# Patient Record
Sex: FEMALE | Race: WHITE | NOT HISPANIC OR LATINO | Employment: FULL TIME | URBAN - METROPOLITAN AREA
[De-identification: names, ages, dates, MRNs, and addresses within clinical notes are randomized per-mention and may not be internally consistent; named-entity substitution may affect disease eponyms.]

---

## 2017-10-03 ENCOUNTER — GENERIC CONVERSION - ENCOUNTER (OUTPATIENT)
Dept: OTHER | Facility: OTHER | Age: 57
End: 2017-10-03

## 2017-10-03 ENCOUNTER — ALLSCRIPTS OFFICE VISIT (OUTPATIENT)
Dept: OTHER | Facility: OTHER | Age: 57
End: 2017-10-03

## 2017-10-03 DIAGNOSIS — M25.521 PAIN IN RIGHT ELBOW: ICD-10-CM

## 2017-10-03 DIAGNOSIS — R53.83 OTHER FATIGUE: ICD-10-CM

## 2017-10-03 DIAGNOSIS — Z12.31 ENCOUNTER FOR SCREENING MAMMOGRAM FOR MALIGNANT NEOPLASM OF BREAST: ICD-10-CM

## 2017-10-03 DIAGNOSIS — Z86.2 PERSONAL HISTORY OF DISEASES OF THE BLOOD AND BLOOD-FORMING ORGANS AND CERTAIN DISORDERS INVOLVING THE IMMUNE MECHANISM (CODE): ICD-10-CM

## 2017-10-09 LAB
ADEQUACY: (HISTORICAL): NORMAL
CLINICAL HISTORY (HISTORICAL): NORMAL
CLINICIAN PROVIDIED ICD 9 OR 10 (HISTORICAL): NORMAL
COMMENT (HISTORICAL): NORMAL
DIAGNOSIS (HISTORICAL): NORMAL
HPV HIGH RISK (HISTORICAL): NEGATIVE
NOTE: (HISTORICAL): NORMAL
PERFORMED BY (HISTORICAL): NORMAL
TEST INFORMATION (HISTORICAL): NORMAL

## 2017-10-10 ENCOUNTER — GENERIC CONVERSION - ENCOUNTER (OUTPATIENT)
Dept: OTHER | Facility: OTHER | Age: 57
End: 2017-10-10

## 2017-10-20 ENCOUNTER — GENERIC CONVERSION - ENCOUNTER (OUTPATIENT)
Dept: OTHER | Facility: OTHER | Age: 57
End: 2017-10-20

## 2017-11-01 ENCOUNTER — GENERIC CONVERSION - ENCOUNTER (OUTPATIENT)
Dept: OTHER | Facility: OTHER | Age: 57
End: 2017-11-01

## 2017-11-02 ENCOUNTER — APPOINTMENT (OUTPATIENT)
Dept: LAB | Facility: CLINIC | Age: 57
End: 2017-11-02
Payer: COMMERCIAL

## 2017-11-02 ENCOUNTER — TRANSCRIBE ORDERS (OUTPATIENT)
Dept: LAB | Facility: CLINIC | Age: 57
End: 2017-11-02

## 2017-11-02 DIAGNOSIS — R53.83 OTHER FATIGUE: ICD-10-CM

## 2017-11-02 DIAGNOSIS — Z86.2 PERSONAL HISTORY OF DISEASES OF THE BLOOD AND BLOOD-FORMING ORGANS AND CERTAIN DISORDERS INVOLVING THE IMMUNE MECHANISM (CODE): ICD-10-CM

## 2017-11-02 DIAGNOSIS — M25.521 PAIN IN RIGHT ELBOW: ICD-10-CM

## 2017-11-02 LAB
25(OH)D3 SERPL-MCNC: 36.7 NG/ML (ref 30–100)
ALBUMIN SERPL BCP-MCNC: 4.1 G/DL (ref 3.5–5)
ALP SERPL-CCNC: 57 U/L (ref 46–116)
ALT SERPL W P-5'-P-CCNC: 25 U/L (ref 12–78)
ANION GAP SERPL CALCULATED.3IONS-SCNC: 4 MMOL/L (ref 4–13)
AST SERPL W P-5'-P-CCNC: 21 U/L (ref 5–45)
BASOPHILS # BLD AUTO: 0.05 THOUSANDS/ΜL (ref 0–0.1)
BASOPHILS NFR BLD AUTO: 1 % (ref 0–1)
BILIRUB SERPL-MCNC: 0.64 MG/DL (ref 0.2–1)
BUN SERPL-MCNC: 23 MG/DL (ref 5–25)
CALCIUM SERPL-MCNC: 9.2 MG/DL (ref 8.3–10.1)
CHLORIDE SERPL-SCNC: 104 MMOL/L (ref 100–108)
CHOLEST SERPL-MCNC: 158 MG/DL (ref 50–200)
CK SERPL-CCNC: 86 U/L (ref 26–192)
CO2 SERPL-SCNC: 29 MMOL/L (ref 21–32)
CREAT SERPL-MCNC: 0.98 MG/DL (ref 0.6–1.3)
CRP SERPL QL: <3 MG/L
EOSINOPHIL # BLD AUTO: 0.17 THOUSAND/ΜL (ref 0–0.61)
EOSINOPHIL NFR BLD AUTO: 2 % (ref 0–6)
ERYTHROCYTE [DISTWIDTH] IN BLOOD BY AUTOMATED COUNT: 12.6 % (ref 11.6–15.1)
ERYTHROCYTE [SEDIMENTATION RATE] IN BLOOD: 5 MM/HOUR (ref 0–20)
GFR SERPL CREATININE-BSD FRML MDRD: 63 ML/MIN/1.73SQ M
GLUCOSE P FAST SERPL-MCNC: 91 MG/DL (ref 65–99)
HCT VFR BLD AUTO: 41.4 % (ref 34.8–46.1)
HDLC SERPL-MCNC: 79 MG/DL (ref 40–60)
HGB BLD-MCNC: 13.7 G/DL (ref 11.5–15.4)
LDLC SERPL CALC-MCNC: 73 MG/DL (ref 0–100)
LYMPHOCYTES # BLD AUTO: 1.56 THOUSANDS/ΜL (ref 0.6–4.47)
LYMPHOCYTES NFR BLD AUTO: 21 % (ref 14–44)
MCH RBC QN AUTO: 30 PG (ref 26.8–34.3)
MCHC RBC AUTO-ENTMCNC: 33.1 G/DL (ref 31.4–37.4)
MCV RBC AUTO: 91 FL (ref 82–98)
MONOCYTES # BLD AUTO: 0.42 THOUSAND/ΜL (ref 0.17–1.22)
MONOCYTES NFR BLD AUTO: 6 % (ref 4–12)
NEUTROPHILS # BLD AUTO: 5.26 THOUSANDS/ΜL (ref 1.85–7.62)
NEUTS SEG NFR BLD AUTO: 70 % (ref 43–75)
NRBC BLD AUTO-RTO: 0 /100 WBCS
PLATELET # BLD AUTO: 220 THOUSANDS/UL (ref 149–390)
PMV BLD AUTO: 11.9 FL (ref 8.9–12.7)
POTASSIUM SERPL-SCNC: 4.5 MMOL/L (ref 3.5–5.3)
PROT SERPL-MCNC: 7.5 G/DL (ref 6.4–8.2)
RBC # BLD AUTO: 4.57 MILLION/UL (ref 3.81–5.12)
SODIUM SERPL-SCNC: 137 MMOL/L (ref 136–145)
TRIGL SERPL-MCNC: 31 MG/DL
TSH SERPL DL<=0.05 MIU/L-ACNC: 2.44 UIU/ML (ref 0.36–3.74)
WBC # BLD AUTO: 7.48 THOUSAND/UL (ref 4.31–10.16)

## 2017-11-02 PROCEDURE — 84443 ASSAY THYROID STIM HORMONE: CPT

## 2017-11-02 PROCEDURE — 85025 COMPLETE CBC W/AUTO DIFF WBC: CPT

## 2017-11-02 PROCEDURE — 80061 LIPID PANEL: CPT

## 2017-11-02 PROCEDURE — 36415 COLL VENOUS BLD VENIPUNCTURE: CPT

## 2017-11-02 PROCEDURE — 80053 COMPREHEN METABOLIC PANEL: CPT

## 2017-11-02 PROCEDURE — 86140 C-REACTIVE PROTEIN: CPT

## 2017-11-02 PROCEDURE — 85652 RBC SED RATE AUTOMATED: CPT

## 2017-11-02 PROCEDURE — 82550 ASSAY OF CK (CPK): CPT

## 2017-11-02 PROCEDURE — 82306 VITAMIN D 25 HYDROXY: CPT

## 2017-11-02 PROCEDURE — 86430 RHEUMATOID FACTOR TEST QUAL: CPT

## 2017-11-02 PROCEDURE — 86038 ANTINUCLEAR ANTIBODIES: CPT

## 2017-11-02 PROCEDURE — 86618 LYME DISEASE ANTIBODY: CPT

## 2017-11-03 LAB
B BURGDOR IGG SER IA-ACNC: 0.24
B BURGDOR IGM SER IA-ACNC: 0.25
RHEUMATOID FACT SER QL LA: NEGATIVE
RYE IGE QN: NEGATIVE

## 2017-12-07 ENCOUNTER — GENERIC CONVERSION - ENCOUNTER (OUTPATIENT)
Dept: FAMILY MEDICINE CLINIC | Facility: CLINIC | Age: 57
End: 2017-12-07

## 2018-01-12 NOTE — RESULT NOTES
Verified Results  (LC) PapIG, HPV, rfx 16/18 [id number] 12:00AM Umu Garcia   No  of containers  [facility] 01 ThinPrep Vial     Test Name Result Flag Reference   DIAGNOSIS: Comment     NEGATIVE FOR INTRAEPITHELIAL LESION AND MALIGNANCY  Specimen adequacy: Comment     Satisfactory for evaluation  Endocervical and/or squamous metaplastic  cells (endocervical component) are present  Clinician provided ICD10: Comment     [id number]   Clinical history: Comment     Invalid Gender   Performed by: Tea Deal, Cytotechnologist (ASCP)   Comm   Note: Comment     The Pap smear is a screening test designed to aid in the detection of  premalignant and malignant conditions of the uterine cervix  It is not a  diagnostic procedure and should not be used as the sole means of detecting  cervical cancer  Both false-positive and false-negative reports do occur  Test Methodology: Comment     This liquid based ThinPrep(R) pap test was screened with the  use of an image guided system  HPV, high-risk Negative  Negative   This high-risk HPV test detects thirteen high-risk types  (16/18/31/33/35/39/45/51/52/56/58/59/68) without differentiation

## 2018-01-22 VITALS
HEIGHT: 63 IN | DIASTOLIC BLOOD PRESSURE: 74 MMHG | BODY MASS INDEX: 22.86 KG/M2 | OXYGEN SATURATION: 99 % | WEIGHT: 129 LBS | SYSTOLIC BLOOD PRESSURE: 106 MMHG | HEART RATE: 62 BPM | TEMPERATURE: 97.1 F

## 2018-01-22 VITALS
SYSTOLIC BLOOD PRESSURE: 122 MMHG | BODY MASS INDEX: 22.09 KG/M2 | HEIGHT: 64 IN | WEIGHT: 129.38 LBS | DIASTOLIC BLOOD PRESSURE: 76 MMHG

## 2018-01-22 VITALS
WEIGHT: 128.38 LBS | OXYGEN SATURATION: 99 % | HEIGHT: 63 IN | TEMPERATURE: 98.1 F | DIASTOLIC BLOOD PRESSURE: 82 MMHG | HEART RATE: 58 BPM | BODY MASS INDEX: 22.75 KG/M2 | SYSTOLIC BLOOD PRESSURE: 144 MMHG

## 2023-07-17 ENCOUNTER — TELEPHONE (OUTPATIENT)
Dept: NEUROLOGY | Facility: CLINIC | Age: 63
End: 2023-07-17

## 2023-11-25 ENCOUNTER — APPOINTMENT (OUTPATIENT)
Dept: RADIOLOGY | Age: 63
End: 2023-11-25
Payer: MEDICARE

## 2023-11-25 ENCOUNTER — OFFICE VISIT (OUTPATIENT)
Dept: URGENT CARE | Age: 63
End: 2023-11-25
Payer: MEDICARE

## 2023-11-25 VITALS
OXYGEN SATURATION: 100 % | DIASTOLIC BLOOD PRESSURE: 81 MMHG | HEIGHT: 64 IN | RESPIRATION RATE: 18 BRPM | SYSTOLIC BLOOD PRESSURE: 165 MMHG | BODY MASS INDEX: 21.85 KG/M2 | TEMPERATURE: 98 F | HEART RATE: 77 BPM | WEIGHT: 128 LBS

## 2023-11-25 DIAGNOSIS — S62.633A CLOSED DISPLACED FRACTURE OF DISTAL PHALANX OF LEFT MIDDLE FINGER, INITIAL ENCOUNTER: Primary | ICD-10-CM

## 2023-11-25 DIAGNOSIS — S69.92XA INJURY OF FINGER OF LEFT HAND, INITIAL ENCOUNTER: ICD-10-CM

## 2023-11-25 PROCEDURE — 99212 OFFICE O/P EST SF 10 MIN: CPT | Performed by: PHYSICIAN ASSISTANT

## 2023-11-25 PROCEDURE — G0463 HOSPITAL OUTPT CLINIC VISIT: HCPCS | Performed by: PHYSICIAN ASSISTANT

## 2023-11-25 PROCEDURE — 73140 X-RAY EXAM OF FINGER(S): CPT

## 2023-11-25 RX ORDER — CLOBETASOL PROPIONATE 0.5 MG/G
OINTMENT TOPICAL
COMMUNITY
Start: 2023-08-09

## 2023-11-25 RX ORDER — LAMOTRIGINE 100 MG/1
1 TABLET ORAL 2 TIMES DAILY
COMMUNITY
Start: 2023-10-23

## 2023-11-25 RX ORDER — ESTRADIOL 0.1 MG/G
CREAM VAGINAL
COMMUNITY

## 2023-11-25 NOTE — PROGRESS NOTES
[facility]    NAME: Génesis Dasilva is a 70 y.o. female  : 1960    MRN: [id number]  DATE: 2023  TIME: 10:45 AM    Assessment and Plan   Closed displaced fracture of distal phalanx of left middle finger, initial encounter [S62.633A]  1. Closed displaced fracture of distal phalanx of left middle finger, initial encounter  XR finger left third digit-middle    Ambulatory referral to Orthopedic Surgery        X-ray left middle finger with acute fracture. Await radiologist final test results. Patient has her own splint. Provider did help secure with [place]. Patient Instructions     Patient Instructions   Splint for protection. Referral to ortho. You may call the orthopedic scheduling office at [phone] to inquire about scheduling an appointment. Chief Complaint     Chief Complaint   Patient presents with    Finger Injury     Left 3rd finger injury x 2 days       History of Present Illness   Génesis Dasilva presents to the clinic c/o  Pt comes in with complaint of: Left middle finger pain. Started: Couple days ago was playing football with her grandson and took the ball on the end of her finger causing injury. Associated symptoms: Pain, swelling, limited range of motion. Modifying factors: Using splint for protection. Hand dominance: Right. Will be leaving this next week to go out of town for 2 weeks and then will be home for 2 weeks before she leaves again for a few months. Review of Systems   Review of Systems   Constitutional: Negative. Musculoskeletal:  Positive for arthralgias and joint swelling. Skin:  Negative for color change.        Current Medications     Long-Term Medications   Medication Sig Dispense Refill    clobetasol (TEMOVATE) 0.05 % ointment Apply to affected area twice dailyx2 weeks then [id number] then 1-2x weekly      estradiol (ESTRACE VAGINAL) 0.1 mg/g vaginal cream Insert into the vagina      lamoTRIgine (LaMICtal) 100 mg tablet Take 1 tablet by mouth 2 (two) times a day      Omega-3 Fatty Acids (Fish Oil) 645 MG CAPS Take by mouth         Current Allergies     Allergies as of 2023    (No Known Allergies)          The following portions of the patient's history were reviewed and updated as appropriate: allergies, current medications, past family history, past medical history, past social history, past surgical history and problem list.  Past Medical History:   Diagnosis Date    Seizure ([address])      Past Surgical History:   Procedure Laterality Date     SECTION      DENTAL SURGERY      FRACTURE SURGERY Bilateral     TUBAL LIGATION       History reviewed. No pertinent family history. Objective   /81   Pulse 77   Temp 98 °F (36.7 °C)   Resp 18   Ht 5' 4" (1.626 m)   Wt 58.1 kg (128 lb)   SpO2 100%   BMI 21.97 kg/m²   No LMP recorded. Physical Exam     Physical Exam  Vitals and nursing note reviewed. Constitutional:       General: She is not in acute distress. Appearance: Normal appearance. She is well-developed. She is not ill-appearing, toxic-appearing or diaphoretic. Cardiovascular:      Rate and Rhythm: Normal rate. Pulmonary:      Effort: Pulmonary effort is normal.   Musculoskeletal:         General: Swelling, tenderness and signs of injury present. Comments: DIP left middle finger with swelling and TTP. Skin:     General: Skin is warm and dry. Findings: No bruising or rash. Neurological:      Mental Status: She is alert and oriented to person, place, and time.    Psychiatric:         Mood and Affect: Mood normal.         Behavior: Behavior normal.

## 2023-11-25 NOTE — PATIENT INSTRUCTIONS
Splint for protection. Referral to ortho. You may call the orthopedic scheduling office at [phone] to inquire about scheduling an appointment.

## 2023-11-27 ENCOUNTER — OFFICE VISIT (OUTPATIENT)
Dept: OBGYN CLINIC | Facility: MEDICAL CENTER | Age: 63
End: 2023-11-27
Payer: MEDICARE

## 2023-11-27 ENCOUNTER — OFFICE VISIT (OUTPATIENT)
Dept: PHYSICAL THERAPY | Facility: MEDICAL CENTER | Age: 63
End: 2023-11-27
Payer: MEDICARE

## 2023-11-27 VITALS
HEIGHT: 64 IN | HEART RATE: 60 BPM | WEIGHT: 130.2 LBS | DIASTOLIC BLOOD PRESSURE: 81 MMHG | BODY MASS INDEX: 22.23 KG/M2 | SYSTOLIC BLOOD PRESSURE: 139 MMHG

## 2023-11-27 DIAGNOSIS — M20.012 ACQUIRED MALLET DEFORMITY OF LEFT MIDDLE FINGER: ICD-10-CM

## 2023-11-27 DIAGNOSIS — S62.633A CLOSED DISPLACED FRACTURE OF DISTAL PHALANX OF LEFT MIDDLE FINGER, INITIAL ENCOUNTER: ICD-10-CM

## 2023-11-27 DIAGNOSIS — S62.633A CLOSED DISPLACED FRACTURE OF DISTAL PHALANX OF LEFT MIDDLE FINGER, INITIAL ENCOUNTER: Primary | ICD-10-CM

## 2023-11-27 DIAGNOSIS — M20.012 ACQUIRED MALLET DEFORMITY OF LEFT MIDDLE FINGER: Primary | ICD-10-CM

## 2023-11-27 PROCEDURE — 97760 ORTHOTIC MGMT&TRAING 1ST ENC: CPT | Performed by: PHYSICAL THERAPIST

## 2023-11-27 PROCEDURE — 99203 OFFICE O/P NEW LOW 30 MIN: CPT | Performed by: ORTHOPAEDIC SURGERY

## 2023-11-27 NOTE — PROGRESS NOTES
Splint    Diagnosis:   1. Closed displaced fracture of distal phalanx of left middle finger, initial encounter  Ambulatory Referral to PT/OT Hand Therapy      2. 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 Yes  Patient or Caregiver able to don/doff orthotic independently? Yes    Written orders provided to patient?  Yes  Orders Obtained: Written  Orders Obtained from: Evangelina Montejo MD

## 2023-12-27 ENCOUNTER — OFFICE VISIT (OUTPATIENT)
Dept: OBGYN CLINIC | Facility: MEDICAL CENTER | Age: 63
End: 2023-12-27
Payer: MEDICARE

## 2023-12-27 ENCOUNTER — OFFICE VISIT (OUTPATIENT)
Dept: PHYSICAL THERAPY | Facility: MEDICAL CENTER | Age: 63
End: 2023-12-27

## 2023-12-27 VITALS
DIASTOLIC BLOOD PRESSURE: 79 MMHG | HEIGHT: 64 IN | BODY MASS INDEX: 21.51 KG/M2 | SYSTOLIC BLOOD PRESSURE: 122 MMHG | WEIGHT: 126 LBS | HEART RATE: 62 BPM

## 2023-12-27 DIAGNOSIS — M20.012 ACQUIRED MALLET DEFORMITY OF LEFT MIDDLE FINGER: ICD-10-CM

## 2023-12-27 DIAGNOSIS — M20.012 ACQUIRED MALLET DEFORMITY OF LEFT MIDDLE FINGER: Primary | ICD-10-CM

## 2023-12-27 PROCEDURE — 99213 OFFICE O/P EST LOW 20 MIN: CPT | Performed by: ORTHOPAEDIC SURGERY

## 2023-12-27 RX ORDER — AMOXICILLIN 500 MG/1
CAPSULE ORAL
COMMUNITY
Start: 2023-10-09

## 2023-12-27 NOTE — PROGRESS NOTES
Mallet splint adjusted for fit using dorsal splint design. Patient has KT to hold DIP into hyperextension. Encouraged her to return for adjustments as needed.

## 2023-12-28 NOTE — PROGRESS NOTES
HAND & UPPER EXTREMITY OFFICE VISIT   Referred By:  Shadia Mace Md  [facility]  [address] [place] [address]  [address] [place] [id number]      Chief Complaint:     Left long finger pain    Previous History:   63-year-old right-hand-dominant female with left long finger pain after a bony mallet injury on 2023.  She is initially placed in a custom extension splint by hand therapy and instructed on its use.  The plan was for 6 weeks of full-time finger extension followed by likely 6 weeks of nighttime use.    Interval History:  Returns for follow-up 6 weeks after initial visit.  She states that she is been having difficulty keeping the splint on.  She states the initial Velcro straps were not holding in place.  She has been taping it but states it is still coming off sometimes in her fingers bending.  Shortly after obtaining her extension splint she did go on vacation and was unable to return for any adjustments.  She has not followed up with a therapist since the splint was made.  She is worried that she will need continued splinting.    Past Medical History:  Past Medical History:   Diagnosis Date    Seizure (HCC)      Past Surgical History:   Procedure Laterality Date     SECTION      DENTAL SURGERY      FRACTURE SURGERY Bilateral     TUBAL LIGATION       History reviewed. No pertinent family history.  Social History     Socioeconomic History    Marital status: Single     Spouse name: Not on file    Number of children: Not on file    Years of education: Not on file    Highest education level: Not on file   Occupational History    Not on file   Tobacco Use    Smoking status: Never     Passive exposure: Never    Smokeless tobacco: Never   Vaping Use    Vaping status: Never Used   Substance and Sexual Activity    Alcohol use: Yes    Drug use: Never    Sexual activity: Not on file   Other Topics Concern    Not on file   Social History Narrative    Not on file     Social  "Determinants of Health     Financial Resource Strain: Not on file   Food Insecurity: Not on file   Transportation Needs: Not on file   Physical Activity: Not on file   Stress: Not on file   Social Connections: Not on file   Intimate Partner Violence: Not on file   Housing Stability: Not on file     Scheduled Meds:  Continuous Infusions:No current facility-administered medications for this visit.    PRN Meds:.  Allergies   Allergen Reactions    Nitrofurantoin Rash     Other reaction(s): RASH, PULMONARY REACTION    Diazepam Other (See Comments)     Larthagic       Physical Examination:    /79   Pulse 62   Ht 5' 4\" (1.626 m)   Wt 57.2 kg (126 lb)   BMI 21.63 kg/m²     Gen: A&Ox3, NAD  Cardiac: regular rate  Chest: non labored breathing  Abdomen: Non-distended    Left Upper Extremity:  Skin CDI  No obvious deformity of the shoulder, arm, elbow, forearm, wrist, hand  Sensation intact to light touch in the axillary median, ulnar, and radial nerve distributions  5/5 motor   2+RP  Swelling DIP joint of left long finger with TTP   Not able to extend finger at DIP joint with ~40 deg lag, able to extend at PIP and MCP joints.   Sensation intact all digits    Studies:  No new imaging.    Assessment and Plan:  1. 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 female presents in follow up for the above diagnosis.  Unfortunate she is been having difficulties with splint wear.  Her finger still has an extensive lag and we will have to restart the splinting process.  A new referral was given to hand therapy today for a fabrication of a custom dorsally based hyperextension DIP splint.  Hopefully this will fit better and allow her to maintain finger extension for the entire course of treatment.  I will see her in 4 weeks for repeat evaluation.    she expressed understanding of the plan and agreed. We encouraged them to contact our office with any questions or " concerns.       Valente Gonzalez MD  Hand and Upper Extremity Surgery      *This note was dictated using Dragon voice recognition software. Please excuse any word substitutions or errors.*

## 2024-01-15 ENCOUNTER — TELEPHONE (OUTPATIENT)
Dept: NEUROLOGY | Facility: CLINIC | Age: 64
End: 2024-01-15

## 2024-01-15 NOTE — TELEPHONE ENCOUNTER
Pt left a vm but I was not able to pull up her chart.   Cb [phone]     Call returned to pt. Acknowledge vm. Pt states that she got a missed call from our office. Advised pt that I don't see any documentation about this call. Pt did confirm her NP appt w/ Dr Jo on 4/3/24 at 2 pm.

## 2024-02-07 ENCOUNTER — OFFICE VISIT (OUTPATIENT)
Dept: OBGYN CLINIC | Facility: MEDICAL CENTER | Age: 64
End: 2024-02-07
Payer: MEDICARE

## 2024-02-07 VITALS
SYSTOLIC BLOOD PRESSURE: 112 MMHG | WEIGHT: 126 LBS | HEIGHT: 64 IN | HEART RATE: 64 BPM | BODY MASS INDEX: 21.51 KG/M2 | DIASTOLIC BLOOD PRESSURE: 72 MMHG

## 2024-02-07 DIAGNOSIS — M20.012 ACQUIRED MALLET DEFORMITY OF LEFT MIDDLE FINGER: Primary | ICD-10-CM

## 2024-02-07 PROCEDURE — 99213 OFFICE O/P EST LOW 20 MIN: CPT | Performed by: ORTHOPAEDIC SURGERY

## 2024-02-07 NOTE — PROGRESS NOTES
HAND & UPPER EXTREMITY OFFICE VISIT   Referred By:  No referring provider defined for this encounter.      Chief Complaint:     Left long finger pain     Previous History:   Patient was last seen on 2023 and she was advised to get a new splint fabricated and start the immobilization process over again    Interval History:  Since last visit she got a new splint and this new splint felt like a better fit. She has been alternating between the two splints when she washes her hands. She states she has been splint compliant and the therapy even used tape on her finger under her splint which seems to keep her finger in better extension  Some soreness at her finger tip. Presetns with splint on today    Past Medical History:  Past Medical History:   Diagnosis Date    Seizure (HCC)      Past Surgical History:   Procedure Laterality Date     SECTION      DENTAL SURGERY      FRACTURE SURGERY Bilateral     TUBAL LIGATION       No family history on file.  Social History     Socioeconomic History    Marital status: Single     Spouse name: Not on file    Number of children: Not on file    Years of education: Not on file    Highest education level: Not on file   Occupational History    Not on file   Tobacco Use    Smoking status: Never     Passive exposure: Never    Smokeless tobacco: Never   Vaping Use    Vaping status: Never Used   Substance and Sexual Activity    Alcohol use: Yes    Drug use: Never    Sexual activity: Not on file   Other Topics Concern    Not on file   Social History Narrative    Not on file     Social Determinants of Health     Financial Resource Strain: Not on file   Food Insecurity: Not on file   Transportation Needs: Not on file   Physical Activity: Not on file   Stress: Not on file   Social Connections: Not on file   Intimate Partner Violence: Not on file   Housing Stability: Not on file     Scheduled Meds:  Continuous Infusions:No current facility-administered medications for this  "visit.    PRN Meds:.  Allergies   Allergen Reactions    Nitrofurantoin Rash     Other reaction(s): RASH, PULMONARY REACTION    Diazepam Other (See Comments)     Larthagic       Physical Examination:    /72   Pulse 64   Ht 5' 4\" (1.626 m)   Wt 57.2 kg (126 lb)   BMI 21.63 kg/m²     Gen: A&Ox3, NAD  Cardiac: regular rate  Chest: non labored breathing  Abdomen: Non-distended        Right Upper Extremity:  Skin CDI  No obvious deformity of the shoulder, arm, elbow, forearm, wrist, hand  Sensation intact to light touch in the axillary median, ulnar, and radial nerve distributions  motor intact  2+RP      Left Upper Extremity:  Skin CDI  No obvious deformity of the shoulder, arm, elbow, forearm, wrist, hand  Sensation intact to light touch in the axillary median, ulnar, and radial nerve distributions  motor intact  2+RP  No lag noted at DIP joint of Middle finger with splint and tape removed  Composite fist not tested    Studies:  No new images obtained      Assessment and Plan:  1. 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 female presents in follow up for the above diagnosis.     No lag noted at her DIP when the tape and splint were removed. At this time she may start gentle flexion of her finger and remove the splint during the day. She will be stiff into flexion so she was advised to start with gentle ROM first. She was encouraged to work on forming a composite fist  She should wear the splint at night to sleep  NSAID's as needed for pain    F/U in 6 weeks for ROM check    she expressed understanding of the plan and agreed. We encouraged them to contact our office with any questions or concerns.       Valente Gonzalez MD  Hand and Upper Extremity Surgery      *This note was dictated using Dragon voice recognition software. Please excuse any word substitutions or errors.*    "

## 2024-02-16 ENCOUNTER — TELEPHONE (OUTPATIENT)
Dept: OBGYN CLINIC | Facility: MEDICAL CENTER | Age: 64
End: 2024-02-16

## 2024-02-16 NOTE — TELEPHONE ENCOUNTER
The pt was called due to an appt that was scheduled during a time that conflicts with the provider. The pt confirmed that changing the appt to an earlier time was okay.

## 2024-03-14 ENCOUNTER — OFFICE VISIT (OUTPATIENT)
Dept: OBGYN CLINIC | Facility: MEDICAL CENTER | Age: 64
End: 2024-03-14
Payer: MEDICARE

## 2024-03-14 VITALS
WEIGHT: 126 LBS | SYSTOLIC BLOOD PRESSURE: 126 MMHG | HEIGHT: 64 IN | DIASTOLIC BLOOD PRESSURE: 78 MMHG | BODY MASS INDEX: 21.51 KG/M2 | HEART RATE: 52 BPM

## 2024-03-14 DIAGNOSIS — M20.012 ACQUIRED MALLET DEFORMITY OF LEFT MIDDLE FINGER: Primary | ICD-10-CM

## 2024-03-14 PROCEDURE — 99213 OFFICE O/P EST LOW 20 MIN: CPT | Performed by: ORTHOPAEDIC SURGERY

## 2024-03-14 RX ORDER — OMEPRAZOLE 20 MG/1
CAPSULE, DELAYED RELEASE ORAL
COMMUNITY
Start: 2024-03-14

## 2024-03-14 NOTE — PROGRESS NOTES
"    HAND & UPPER EXTREMITY OFFICE VISIT   Referred By:  No referring provider defined for this encounter.      Chief Complaint:     Left long finger pain    Previous History:   Patient was last seen on 24 and was instructed to begin gentle flexion of her finger and to remove the splint during the day but must wear it to sleep at night.     Interval History:  Since her last visit patient reports that she has discontinued the use of her splint without issue. She denies pain about the index finger on a daily basis. She continues to perform home stretching program with benefit. She does note a small \"bump\" on the dorsal aspect of her index finger that is not painful. No numbness or tingling. No fevers or chills.     Past Medical History:  Past Medical History:   Diagnosis Date    Seizure (HCC)      Past Surgical History:   Procedure Laterality Date     SECTION      DENTAL SURGERY      FRACTURE SURGERY Bilateral     TUBAL LIGATION       History reviewed. No pertinent family history.  Social History     Socioeconomic History    Marital status: Single     Spouse name: Not on file    Number of children: Not on file    Years of education: Not on file    Highest education level: Not on file   Occupational History    Not on file   Tobacco Use    Smoking status: Never     Passive exposure: Never    Smokeless tobacco: Never   Vaping Use    Vaping status: Never Used   Substance and Sexual Activity    Alcohol use: Yes    Drug use: Never    Sexual activity: Not on file   Other Topics Concern    Not on file   Social History Narrative    Not on file     Social Determinants of Health     Financial Resource Strain: Not on file   Food Insecurity: Not on file   Transportation Needs: Not on file   Physical Activity: Not on file   Stress: Not on file   Social Connections: Not on file   Intimate Partner Violence: Not on file   Housing Stability: Not on file     Scheduled Meds:  Continuous Infusions:No current " "facility-administered medications for this visit.    PRN Meds:.  Allergies   Allergen Reactions    Nitrofurantoin Rash     Other reaction(s): RASH, PULMONARY REACTION    Diazepam Other (See Comments)     Larthagic       Physical Examination:    /78   Pulse (!) 52   Ht 5' 4\" (1.626 m)   Wt 57.2 kg (126 lb)   BMI 21.63 kg/m²     Gen: A&Ox3, NAD  Cardiac: regular rate  Chest: non labored breathing  Abdomen: Non-distended    Cervcial Spine: Negative Spurling's    Left Upper Extremity:  Skin intact  Sensation intact to light touch in the axillary median, ulnar, and radial nerve distributions  A 10 degree extensor lag noted at DIP joint of Middle finger with full flexion of the DIP joint  There is a slight dorsal prominence over the proximal distal phalanx consistent with scar tissue  Composite fist  2+RP    Studies:  No new imaging      Assessment and Plan:  1. 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 female presents in follow up for the above diagnosis.  Very slight residual extensor lag noted with full flexion. No pain on examination. She may continue with gentle ROM exercises as tolerated.  We discussed that the \"bump\" on the dorsal aspect of her finger is related to scar tissue from tendon healing and the slight extensor lag making the base of the distal phalanx more prominent.  This will likely not decrease in size in a significant manner however she can participate in scar massage to see if she can smooth it out slightly.  Overall the bump does not bother some but is noticeable to her.  She will try scar massage.  She will follow up on an as needed basis    she expressed understanding of the plan and agreed. We encouraged them to contact our office with any questions or concerns.       Valente Gonzalez MD  Hand and Upper Extremity Surgery      *This note was dictated using Dragon voice recognition software. Please excuse any word substitutions or errors.*    Scribe Attestation  "     I,:  Madi Garcia am acting as a scribe while in the presence of the attending physician.:       I,:  Valente Gonzalez MD personally performed the services described in this documentation    as scribed in my presence.:

## 2024-04-03 ENCOUNTER — OFFICE VISIT (OUTPATIENT)
Dept: NEUROLOGY | Facility: CLINIC | Age: 64
End: 2024-04-03
Payer: MEDICARE

## 2024-04-03 VITALS
DIASTOLIC BLOOD PRESSURE: 65 MMHG | WEIGHT: 128.2 LBS | BODY MASS INDEX: 21.89 KG/M2 | TEMPERATURE: 97.9 F | SYSTOLIC BLOOD PRESSURE: 145 MMHG | OXYGEN SATURATION: 99 % | HEIGHT: 64 IN | HEART RATE: 57 BPM

## 2024-04-03 DIAGNOSIS — R56.9 SEIZURE (HCC): Primary | ICD-10-CM

## 2024-04-03 PROCEDURE — 99205 OFFICE O/P NEW HI 60 MIN: CPT | Performed by: PSYCHIATRY & NEUROLOGY

## 2024-04-03 RX ORDER — FAMOTIDINE 10 MG
10 TABLET ORAL AS NEEDED
COMMUNITY

## 2024-04-03 RX ORDER — POLYETHYLENE GLYCOL 3350 17 G/17G
17 POWDER, FOR SOLUTION ORAL DAILY
COMMUNITY

## 2024-04-03 RX ORDER — LACOSAMIDE 100 MG/1
100 TABLET ORAL EVERY 12 HOURS SCHEDULED
Qty: 60 TABLET | Refills: 5 | Status: SHIPPED | OUTPATIENT
Start: 2024-04-03

## 2024-04-03 NOTE — PATIENT INSTRUCTIONS
Start lacosamide 100 mg pills. Take half pill twice daily for 3 days and then take 1 pill twice daily.   Continue lamotrigine  mg daily until take lacosamide 100 mg twice daily for one week and then stop lamotrigine.   Have EEG done.   Let us know if there are events concerning for seizure.   Return in about 4 months.

## 2024-04-03 NOTE — PROGRESS NOTES
[facility]  Initial Consultation    Impression/Plan    Ms. Vidal is a 63 y.o. female presenting for evaluation regarding possible epilepsy. She had brief, stereotyped, episodes of feeling spaced out, some with possible cognitive symptoms or delayed responses, that resolved with clonazepam in 2021 and may have been seizure, but anxiety is also a possible explanation. She then had 2 apparent generalized tonic clonic seizures without significant prodrome in 7/2023. The second event was associated with bradycardia. Subsequent ambulatory cardiac monitoring was unremarkable. The clinical description is relatively convincing for seizures, but convulsive syncope remains a less likely possibility. Brain MRIs and EEGs, including 72 hour AEEG, have been unrevealing.     In summary, there were mild possible focal aware seizures and then 2 probable bilateral tonic clonic seizures in 24 hours. She does not meet criteria for a definite diagnosis of epilepsy, but I think an underlying seizure tendency is likely and recommend continuing anti-seizure medication for now. Obtaining sleep deprived EEG to try and confirm diagnosis. She is experiencing insomnia since starting lamotrigine. Will transition to lacosamide.     Patient Instructions   Start lacosamide 100 mg pills. Take half pill twice daily for 3 days and then take 1 pill twice daily.   Continue lamotrigine  mg daily until take lacosamide 100 mg twice daily for one week and then stop lamotrigine.   Have EEG done.   Let us know if there are events concerning for seizure.   Return in about 4 months.      Diagnoses and all orders for this visit:    Seizure (HCC)  -     EEG Sleep deprived; Future  -     lacosamide (VIMPAT) 100 mg tablet; Take 1 tablet (100 mg total) by mouth every 12 (twelve) hours    Other orders  -     Probiotic Product (PROBIOTIC PO); Take by mouth  -     famotidine (PEPCID) 10 mg tablet; Take 10 mg by mouth if  "needed for heartburn  -     polyethylene glycol (GLYCOLAX) 17 GM/SCOOP powder; Take 17 g by mouth daily        Subjective    Ashley Vidal is a 63 y.o. female presenting to the [facility] for evaluation of seizures. She was previously seen by Dr. Daljit Bianchi with [facility] neurology in 2/2024. History is from the patient, her significant other (by phone) and review of the records (Dr. Bianchi's notes, records from [place]).     Fall of 2020. 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 First event was when driving home from work. Suddenly spacey and maybe lightheaded (but not faint), more spaced out. Lasted a few seconds, less than 30 seconds. Wouldn't cluster. Could talk through it, not tired afterward. No associated anxiety or other sensations. No chest sensation, no palpitations, no vision change. Events continued to occur. Had EEGs, including 72 hour AEEG that was normal. Brain MRI normal. She was started on clonazepam with the consideration that events could be due to seizure or anxiety. Events resolved with clonazepam in 2021. She was taking 0.25 mg bid.     She tells me there were no features of the events that would allow them to be identified by others, such as difficulty with her speech or behavioral arrest - her significant other agrees with this and states that she would have to tell him that an event occurred. However, some of the notes by Dr. Bianchi suggest there were features that could be identified by others. When I asked her about this she tells me that there was a misunderstanding and that her friends were talking to her once and expecting her to reply, but she did not respond because she was trying to focus on her event so that she could figure out how to explain the symptoms to them. Dr. Bianchi's history from 7/2021:   \"She states she is having \"brain fades\", she first experienced this back in " "October of 2020. She describes it as being spaced out, cloudy and then it clears.  She thinks this can last from seconds to about less than a minute. She says she was driving home in October 2020 when she experienced the first episode. She did not lose control of her car, she knew she was driving and her brain felt a little fuzzy. Her friends accompany her and describe this as her having a hard time saying what she wanted to say. Recently she had four episodes in one day , this was about two weeks ago. She was confused driving to her friend's house but made it there. She says while she was out shopping with her friends she suddenly had a vision, described as a dream that lasted about less than a minute while walking. She then forgot what this dream or vision was about suddenly as if she had woken from a dream. After this she appeared foggy to her friends, spacy and having difficulty getting words out. They were then driving going to a different place when she was talking and suddenly stopped , she was stuttering, not making since and she read a 50 MPH sign and said plow and does not remember the episode herself. She says last year possibly in August she fell while using a machine at home and landed on her head but did not lose consciousness. \"    In [place] 7/21/2023. She was traveling and hiking. Was with significant other and his family. At [facility]. Was not strenuous. Sat and then event occurred when walking. She has not recall the event and next memory is walking with assistance back to car. She remembers feeling confused. Collapsed to ground, no injury.     We called her significant other during the visit and he gave the following history regarding the event in [place]: Took break in shade, ate a little. Started down hill. Rick saw her go down, cousin ran and caught her. She looked dazed and was spinning and then collapsed. Down on side right away (not maintained in upright position for any significant amount of " time). Unresponsive. Whole body stiff with high frequency shaking which maybe lasted 2 minutes and then unconscious for another 3-4 minutes. Had amnesia to a 2 hour period. In the hospital there was another event.     When she traveled to [place] she had run out of her clonazepam and the local pharmacy did not have a supply and she was transitioned to diazepam.  She did not feel well on 2 mg twice a day, so the dose was reduced to 1 mg twice a day (significantly lower dose than would be equivalent to her clonazepam 0.25 mg twice daily). A few days later she had her event while hiking on 7/21. The records state that she lost consciousness without warning and then had an apparent generalized tonic-clonic seizure.  There was apparent postictal state.  She was brought to local hospital and she had another apparent generalized tonic-clonic seizure while in the hospital associated with an episode of bradycardia to the 30s.  Neither event involved tongue bite or incontinence.  She apparently had elevated troponins and she was transferred to another hospital and seen by cardiology.  MRI and EEG were unremarkable.  She was placed on a 3-week Gesture[employer] heart monitor which was negative.  She was restarted on a higher dose of clonazepam, 0.25 mg 3 times daily for a few weeks and then changed back to 0.25 mg twice daily.  He was unclear whether she had an unprovoked seizure or maybe a seizure provoked by benzodiazepine withdrawal or maybe cardiogenic syncope.  She had another EEG in the [facility] last year.  That was normal.  Under Dr. Ryan's guidance she was started on lamotrigine and transitioned off of clonazepam.  Lamotrigine seem to cause insomnia.  It was transition to extended release in the morning, 200 mg.  There have been no additional events concerning for seizure, none of her spacing out events have occurred.  She continues to have more difficulty with sleep in the past.    Current AEDs:  Lamotrigine  mg in the  "morning  Medication side effects: insomnia  Medication adherence: Yes    Event/Seizure semiology:  Two apparent generalized tonic clonic seizures without warning on 7/21/2023.   Feeling spaced out for seconds. Began in 2020, resolved in 2021 with clonazepam. Prior records suggest possible speech arrest or abnormal speech noted by others with some events.     Special Features  Status epilepticus: no  Self Injury Seizures: no  Precipitating Factors:  None  Age/Date of seizure onset: 2020 (spacing out), 2023 (generalized tonic clonic seizure)  Longest seizure free interval: months    Epilepsy Risk Factors:  None  Normal birth and early development. No history of febrile seizures. No history of CNS infection. No family history of epilepsy. No significant head injury.     Prior AEDs:  Clonazepam  Lamotrigine (current, insomnia)    Prior Evaluation:  Multiple REEGs and 72 hour AEEG normal  Brain MRIs normal    Brain MRI 7/29/2021:  1. No acute infarction. No mass. No abnormal contrast-enhancement.   2. Few small FLAIR hyperintense foci in cerebral white matter. Those are   probably age-related or chronic small vessel ischemic changes.     History Reviewed:   The following were reviewed and updated as appropriate: allergies, current medications, past family history, past medical history, past social history, past surgical history, and problem list     Psychiatric History:  None    Social History:   Driving: Yes  Lives Alone: No  Occupation: retired.           Objective    /65 (BP Location: Left arm, Patient Position: Sitting, Cuff Size: Adult)   Pulse 57   Temp 97.9 °F (36.6 °C) (Temporal)   Ht 5' 4\" (1.626 m)   Wt 58.2 kg (128 lb 3.2 oz)   SpO2 99%   BMI 22.01 kg/m²      General Exam  General: well developed, no acute distress.  HEENT: mucous membranes moist, anicteric sclera.   Neck:  good ROM.    Neurological Exam  Mental Status: awake, alert, and fully oriented to person, place, time, and situation. " Attention and memory intact. Fund of knowledge is appropriate for age and education.  There is no neglect.  Language: fluency, naming, comprehension normal.       Cranial Nerves: Pupils equal and reactive to light.  Visual fields full to confrontation. Extraocular motions intact with full versions, normal pursuits and saccades. Facial strength full and symmetric. Facial sensation intact in V1-V3. Hearing intact to finger rub bilaterally. Tongue protrudes to midline. Palate elevates symmetrically. Speech clear without notable dysarthria. Shoulder shrug activation full and symmetric.    Motor: Normal bulk and tone. No pronator drift. Strength is 5/5 proximally and distally in all 4 extremities. No involuntary movements.    Sensory: Sensation intact to light touch distally in all extremities.    Coordination: Normal finger-to-nose.     Station and gait: Casual gait normal.     Reflexes: Reflexes 2+ throughout and symmetric (brachioradialis, patella).         Alessio Jo MD   [facility]  Diplomate, ABPN Neurology and Epilepsy      I have spent a total time of 65 minutes on 4/3/2024 in caring for this patient including Diagnostic results, Prognosis, Risks and benefits of tx options, Instructions for management, Patient and family education, Importance of tx compliance, Risk factor reductions, Impressions, Counseling / Coordination of care, Documenting in the medical record, Reviewing / ordering tests, medicine, procedures  , and Obtaining or reviewing history  .

## 2024-04-08 ENCOUNTER — TELEPHONE (OUTPATIENT)
Dept: NEUROLOGY | Facility: CLINIC | Age: 64
End: 2024-04-08

## 2024-04-08 NOTE — TELEPHONE ENCOUNTER
Recd  4/4 8:50 AM   Ashley stevenson,, [phone]. Birth date, [birth year]. I have an E, E, G sleep deprived study and just questioning at the time for pm appointment. My paperwork says to sleep  until 2 AM.  the lady I scheduled  with said her paper work says  4 am,  just clarifying the time that I should awake for the pm appointment.   ____________  Per appointment desk, documents instructions for EEG:       NEURO EEG SLEEP DEPRIVED  Must have clean hair, washed the night before or the day of the test.  NO styling products after hair is rinsed and dried.    Hair must be dry before test can be done.  NO braid.  NO fasting.  Must eat something within two hours of testing.  NO caffeine the day of testing.  Take any medications as usual.  If patient has DBS (deep brain stimulator), the patient must bring  to the appointment.     Try to go to bed at 9 pm.  DO NOT go to bed earlier than 9 pm.  AM Appointment - Awake at midnight  PM Appointment - Awake at 4 am        EEG scheduled 5/8 3:45 PM

## 2024-04-08 NOTE — TELEPHONE ENCOUNTER
"I spoke to person who is concerned she won't be \"sleep deprived\" for her EEG if she goes to be at 9 PM and awakens at 4 AM.  She said that's more sleep than she usually gets. She also said one set of instructions she received documented to awaken at 2 AM and she is unsure what to do. I let her know I would call EEG center to clarify instructions    I called [phone] to clarify; was told a tech will call patient tomorrow; called patient and advised same.   "

## 2024-05-08 ENCOUNTER — HOSPITAL ENCOUNTER (OUTPATIENT)
Dept: NEUROLOGY | Facility: CLINIC | Age: 64
Discharge: HOME | End: 2024-05-08
Payer: MEDICARE

## 2024-05-08 DIAGNOSIS — R56.9 SEIZURE (HCC): ICD-10-CM

## 2024-05-08 PROCEDURE — 95819 EEG AWAKE AND ASLEEP: CPT | Performed by: STUDENT IN AN ORGANIZED HEALTH CARE EDUCATION/TRAINING PROGRAM

## 2024-05-08 PROCEDURE — 95819 EEG AWAKE AND ASLEEP: CPT

## 2024-09-24 ENCOUNTER — OFFICE VISIT (OUTPATIENT)
Dept: NEUROLOGY | Facility: CLINIC | Age: 64
End: 2024-09-24
Payer: MEDICARE

## 2024-09-24 VITALS
HEIGHT: 64 IN | OXYGEN SATURATION: 99 % | DIASTOLIC BLOOD PRESSURE: 60 MMHG | BODY MASS INDEX: 21.99 KG/M2 | WEIGHT: 128.8 LBS | TEMPERATURE: 98.1 F | HEART RATE: 60 BPM | SYSTOLIC BLOOD PRESSURE: 120 MMHG

## 2024-09-24 DIAGNOSIS — R56.9 SEIZURE (HCC): ICD-10-CM

## 2024-09-24 PROCEDURE — 99213 OFFICE O/P EST LOW 20 MIN: CPT | Performed by: PSYCHIATRY & NEUROLOGY

## 2024-09-24 RX ORDER — LACOSAMIDE 100 MG/1
100 TABLET ORAL EVERY 12 HOURS SCHEDULED
Qty: 180 TABLET | Refills: 1 | Status: SHIPPED | OUTPATIENT
Start: 2024-09-24

## 2024-09-24 NOTE — PROGRESS NOTES
[facility]  Follow Up Visit    Impression/Plan    Ms. Vidal is a 64 y.o. female returning regarding possible epilepsy. She had brief, stereotyped, episodes of feeling spaced out, some with possible cognitive symptoms or delayed responses, that resolved with clonazepam in 2021 and may have been seizure, but anxiety is also a possible explanation. She then had 2 apparent generalized tonic clonic seizures without significant prodrome in 7/2023. The second event was associated with bradycardia. Subsequent ambulatory cardiac monitoring was unremarkable. The clinical description is relatively convincing for seizures, but convulsive syncope remains a less likely possibility. Brain MRIs and EEGs, including 72 hour AEEG, have been unrevealing.      In summary, there were mild possible focal aware seizures and then 2 probable bilateral tonic clonic seizures in 24 hours. She does not meet criteria for a definite diagnosis of epilepsy, but I think an underlying seizure tendency is likely and recommend continuing anti-seizure medication for now. We discussed that lacosamide is typically tolerated well and considered a low risk intervention.         Patient Instructions   Continue lacosamide 100 mg twice daily.   Let us know if there are events concerning for seizure.   Return one year.     Diagnoses and all orders for this visit:    Seizure (HCC)  -     lacosamide (VIMPAT) 100 mg tablet; Take 1 tablet (100 mg total) by mouth every 12 (twelve) hours        Subjective    Ashley Vidal is returning to the [facility] for follow up.     Interval Events:   Seizures since last visit: None  Hospitalizations: no    There have been no events concerning for seizure. Transitioned from lamotrigine to lacosamide at last visit due to concern lamotrigine contributed to insomnia. Insomnia continues to be an intermittent problem and has not clearly changed with the transition to  "lacosamide. [place] and a 90 day supply of lacosamide would be more convenient than 30 days.     Current AEDs:  Lacosamide 100 mg bid  Medication side effects: None  Medication adherence: Yes    Event/Seizure semiology:  Two apparent generalized tonic clonic seizures without warning on 7/21/2023.   Feeling spaced out for seconds. Began in 2020, resolved in 2021 with clonazepam. Prior records suggest possible speech arrest or abnormal speech noted by others with some events.      Special Features  Status epilepticus: no  Self Injury Seizures: no  Precipitating Factors:  None  Age/Date of seizure onset: 2020 (spacing out), 2023 (generalized tonic clonic seizure)  Longest seizure free interval: months     Epilepsy Risk Factors:  None  Normal birth and early development. No history of febrile seizures. No history of CNS infection. No family history of epilepsy. No significant head injury.      Prior AEDs:  Clonazepam  Lamotrigine (insomnia) stopped mid 2024 and replaced by lacosamide.      Prior Evaluation:  Multiple REEGs and 72 hour AEEG normal  SD EEG 5/2024: normal  Brain MRIs normal     Brain MRI 7/29/2021:  1. No acute infarction. No mass. No abnormal contrast-enhancement.   2. Few small FLAIR hyperintense foci in cerebral white matter. Those are   probably age-related or chronic small vessel ischemic changes.      History Reviewed:   The following were reviewed and updated as appropriate: allergies, current medications, past family history, past medical history, past social history, past surgical history, and problem list     Psychiatric History:  None     Social History:   Driving: Yes  Lives Alone: No  Occupation: retired.          Objective    /60 (BP Location: Left arm, Patient Position: Sitting, Cuff Size: Adult)   Pulse 60   Temp 98.1 °F (36.7 °C) (Temporal)   Ht 5' 4\" (1.626 m)   Wt 58.4 kg (128 lb 12.8 oz)   SpO2 99%   BMI 22.11 kg/m²      General " Exam  No acute distress.    Neurologic Exam  Mental Status:  Alert and oriented x 3.  Language: normal fluency and comprehension.  Cranial Nerves:  Face symmetric. No dysarthria.  Gait: Normal casual gait.

## 2024-09-24 NOTE — PATIENT INSTRUCTIONS
Continue lacosamide 100 mg twice daily.   Let us know if there are events concerning for seizure.   Return one year.

## 2024-11-26 ENCOUNTER — TELEPHONE (OUTPATIENT)
Dept: NEUROLOGY | Facility: CLINIC | Age: 64
End: 2024-11-26

## 2024-11-26 NOTE — TELEPHONE ENCOUNTER
Patient is due for a follow up with Dr Jo in Sept 2025. Called patient from wait list to offer appointment. Left VM. When patient returns call, please schedule f/u visit.

## 2024-12-26 ENCOUNTER — TELEPHONE (OUTPATIENT)
Age: 64
End: 2024-12-26

## 2024-12-26 DIAGNOSIS — R56.9 SEIZURE (HCC): ICD-10-CM

## 2024-12-26 RX ORDER — LACOSAMIDE 100 MG/1
100 TABLET ORAL EVERY 12 HOURS SCHEDULED
Qty: 180 TABLET | Refills: 1 | Status: SHIPPED | OUTPATIENT
Start: 2024-12-26

## 2024-12-26 NOTE — TELEPHONE ENCOUNTER
Yes she needs to have an early refill so that she does not run out of her lacosamide while she is on vacation.  Does the pharmacy need a separate prescription?  How many days will she be away?

## 2024-12-26 NOTE — TELEPHONE ENCOUNTER
Phone call to patient regarding her Lacosamide prescription. Advised the prescription has been updated and can be filled today. Patient was appreciative of my assistance.

## 2024-12-26 NOTE — TELEPHONE ENCOUNTER
Pt called. She is leaving for vacation on 12/29/24. She tried to refill her lacosamide, and the pharmacy advised that it cannot be refilled until 12/30/24. She will be away at that time, and is requesting to be able to pick the medication up at the pharmacy today, if possible. CB# [phone]. Okay to leave a detailed message if pt does not answer the phone. Routed to the clinical team to assist with pt's request.

## 2024-12-26 NOTE — TELEPHONE ENCOUNTER
I updated the prescription with  Ok to fill early on 12/27/2024. Patient is going out of the country

## 2024-12-26 NOTE — TELEPHONE ENCOUNTER
Dr Jo,   Please see below documentation. Are you agreeable to authorize an early refill of patient's lacosamide? Please advise. Thank you!

## 2024-12-26 NOTE — TELEPHONE ENCOUNTER
"Phone call to patient regarding Dr. Byrne's message below.    Patient will be away 12-30-24 returning sometime in February for one week. Patient will be leaving again  returning  the last week of March. (Patient does not know her exact itinerary.) Patient is going to the [facility] where there is no pharmacy. Patient has about 1 week of Lacosamide left. Patient  gets a 90 day supply.     Advised I will [email or web address] and call patient back with an update.  -------------------------------------------------------------------------  Phone call to [facility]. Per pharmacist, all a provider needs to do for this type of situation is to put in the notes at the bottom of the E-Script:    \"Ok to fill early on (Insert date here.)  Patient is going out of the country.\" Then they can fill patient's medication early.     Dr. Byrne,   Would you kindly send a new script for patient's   Lacosamide (VIMPAT) 100 mg tablet  90 day supply today with the necessary notation? Thank you!  "

## 2025-05-24 NOTE — PROGRESS NOTES
Problem: PAIN - ADULT  Goal: Verbalizes/displays adequate comfort level or baseline comfort level  Description: Interventions:  - Encourage patient to monitor pain and request assistance  - Assess pain using appropriate pain scale  - Administer analgesics as ordered based on type and severity of pain and evaluate response  - Implement non-pharmacological measures as appropriate and evaluate response  - Consider cultural and social influences on pain and pain management  - Notify physician/advanced practitioner if interventions unsuccessful or patient reports new pain  - Educate patient/family on pain management process including their role and importance of  reporting pain   - Provide non-pharmacologic/complimentary pain relief interventions  Outcome: Progressing     Problem: INFECTION - ADULT  Goal: Absence or prevention of progression during hospitalization  Description: INTERVENTIONS:  - Assess and monitor for signs and symptoms of infection  - Monitor lab/diagnostic results  - Monitor all insertion sites, i.e. indwelling lines, tubes, and drains  - Monitor endotracheal if appropriate and nasal secretions for changes in amount and color  - [place] appropriate cooling/warming therapies per order  - Administer medications as ordered  - Instruct and encourage patient and family to use good hand hygiene technique  - Identify and instruct in appropriate isolation precautions for identified infection/condition  Outcome: Progressing  Goal: Absence of fever/infection during neutropenic period  Description: INTERVENTIONS:  - Monitor WBC  - Perform strict hand hygiene  - Limit to healthy visitors only  - No plants, dried, fresh or silk flowers with dobson in patient room  Outcome: Progressing     Problem: SAFETY ADULT  Goal: Maintain or return to baseline ADL function  Description: INTERVENTIONS:  -  Assess patient's ability to carry out ADLs; assess patient's baseline for ADL function and identify physical deficits  The HAND & UPPER EXTREMITY OFFICE VISIT   Referred By:  Zhen Liang, [address]  [address]  [facility],  [address] [place] [address]      Chief Complaint:     Left long finger DIP avulsion fracture, bony mallet injury 23    History of Present Illness:   70 y.o., right hand dominant female presents with left long finger injury 23. She was playing catch with football with grandson and jammed finger while catching the ball. She was seen at urgent care after injury (note reviewed) and imaging showed avulsion fracture of DIP joint of long finger with possible extensor injury. She presents in alumofoam splint. She continues to note swelling distal tip of finger and not able to fully extend finger. She does have droop at end of long finger. Denies any numbness or tingling, pain is well controlled. retired      Past Medical History:  Past Medical History:   Diagnosis Date    Seizure [facility])      Past Surgical History:   Procedure Laterality Date     SECTION      DENTAL SURGERY      FRACTURE SURGERY Bilateral     TUBAL LIGATION       No family history on file.   Social History     Socioeconomic History    Marital status: Single     Spouse name: Not on file    Number of children: Not on file    Years of education: Not on file    Highest education level: Not on file   Occupational History    Not on file   Tobacco Use    Smoking status: Never    Smokeless tobacco: Never   Substance and Sexual Activity    Alcohol use: Yes    Drug use: Never    Sexual activity: Not on file   Other Topics Concern    Not on file   Social History Narrative    Not on file     Social Determinants of Health     Financial Resource Strain: Not on file   Food Insecurity: Not on file   Transportation Needs: Not on file   Physical Activity: Not on file   Stress: Not on file   Social Connections: Not on file   Intimate Partner Violence: Not on file   Housing Stability: Not on file     Scheduled Meds:  Continuous Infusions:No current facility-administered medications for this visit. PRN Meds:. No Known Allergies        Physical Examination:    /81   Pulse 60   Ht 5' 4" (1.626 m)   Wt 59.1 kg (130 lb 3.2 oz)   BMI 22.35 kg/m²     Gen: A&Ox3, NAD  Cardiac: regular rate  Chest: non labored breathing  Abdomen: Non-distended      Right Upper Extremity:  Skin CDI  No obvious deformity of the shoulder, arm, elbow, forearm, wrist, hand    Sensation intact to light touch in the axillary median, ulnar, and radial nerve distributions  5/5 motor   2+RP      Left Upper Extremity:  Skin CDI  No obvious deformity of the shoulder, arm, elbow, forearm, wrist, hand  Sensation intact to light touch in the axillary median, ulnar, and radial nerve distributions  5/5 motor   2+RP  Swelling DIP joint of left long finger with TTP   Not able to extend finger at DIP joint with ~40 deg lag, able to extend at PIP and MCP joints. Sensation intact all digits      Studies:  Radiographs: I personally reviewed and independently interpreted the available radiographs. xr: Radiographs of the left hand, multiple views, demonstrate avulsion fracture base of the dorsal distal phalanx consistent with bony mallet. Evidence of baseline arthritic changes at the DIP joint. Assessment and Plan:  1. Acquired mallet deformity of left middle finger  Ambulatory Referral to PT/OT Hand Therapy      2. Closed displaced fracture of distal phalanx of left middle finger, initial encounter  Ambulatory referral to Orthopedic Surgery    Ambulatory Referral to PT/OT Hand Therapy          70 y.o. female presents with signs and symptoms consistent with the above diagnosis. We discussed the natural history of this condition and its pathogenesis. We discussed operative and nonoperative treatment options. Patient sustained bony mallet injury to left long finger 11/25/23. She will have referral to hand therapy to make custom extension splint.  Instructed to wear splint at all times which impact ability to perform ADLs (bathing, care of mouth/teeth, toileting, grooming, dressing, etc.)  - Assess/evaluate cause of self-care deficits   - Assess range of motion  - Assess patient's mobility; develop plan if impaired  - Assess patient's need for assistive devices and provide as appropriate  - Encourage maximum independence but intervene and supervise when necessary  - Involve family in performance of ADLs  - Assess for home care needs following discharge   - Consider OT consult to assist with ADL evaluation and planning for discharge  - Provide patient education as appropriate  - Monitor functional capacity and physical performance, use of AM PAC & -HLM   - Monitor gait, balance and fatigue with ambulation    Outcome: Progressing     Problem: CARDIOVASCULAR - ADULT  Goal: Maintains optimal cardiac output and hemodynamic stability  Description: INTERVENTIONS:  - Monitor I/O, vital signs and rhythm  - Monitor for S/S and trends of decreased cardiac output  - Administer and titrate ordered vasoactive medications to optimize hemodynamic stability  - Assess quality of pulses, skin color and temperature  - Assess for signs of decreased coronary artery perfusion  - Instruct patient to report change in severity of symptoms  Outcome: Progressing  Goal: Absence of cardiac dysrhythmias or at baseline rhythm  Description: INTERVENTIONS:  - Continuous cardiac monitoring, vital signs, obtain 12 lead EKG if ordered  - Administer antiarrhythmic and heart rate control medications as ordered  - Monitor electrolytes and administer replacement therapy as ordered  Outcome: Progressing     Problem: RESPIRATORY - ADULT  Goal: Achieves optimal ventilation and oxygenation  Description: INTERVENTIONS:  - Assess for changes in respiratory status  - Assess for changes in mentation and behavior  - Position to facilitate oxygenation and minimize respiratory effort  - Oxygen administered by appropriate delivery if ordered  -  Initiate smoking cessation education as indicated  - Encourage broncho-pulmonary hygiene including cough, deep breathe, Incentive Spirometry  - Assess the need for suctioning and aspirate as needed  - Assess and instruct to report SOB or any respiratory difficulty  - Respiratory Therapy support as indicated  Outcome: Progressing     Problem: GENITOURINARY - ADULT  Goal: Maintains or returns to baseline urinary function  Description: INTERVENTIONS:  - Assess urinary function  - Encourage oral fluids to ensure adequate hydration if ordered  - Administer IV fluids as ordered to ensure adequate hydration  - Administer ordered medications as needed  - Offer frequent toileting  - Follow urinary retention protocol if ordered  Outcome: Progressing      except to shower or bath. Instructed not to bend the finger to allow healing of extensor mechanism. There is a possibility she may lack full extension of long finger secondary to injury but will not impair overall function. OTC anti inflammatories as needed for pain. See patient back in 5 weeks prior to vacation to re evaluate. she expressed understanding of the plan and agreed. We encouraged them to contact our office with any questions or concerns. Jamey Gamble MD  Hand and Upper Extremity Surgery        This note was dictated using Dragon voice recognition software. Please excuse any word substitutions or errors. *

## 2025-06-23 DIAGNOSIS — R56.9 SEIZURE (HCC): ICD-10-CM

## 2025-06-24 NOTE — TELEPHONE ENCOUNTER
Patient Id Prescription # Sold Filled Written Drug Label Qty Days Strength MME* Prescriber Pharmacy Payment REFILL #/Auth State Detail  1 [id number] 03/31/2025 03/31/2025 12/26/2024 Lacosamide (Tablet) 180.0 90 100 MG NA FRANCISCO HARO [facility] Medicare 0 / 0 PA   1 [id number] ** 12/27/2024 12/26/2024 Lacosamide (Tablet) 180.0 90 100 MG NA [facility] 0 / 1 PA   1 [id number] ** 10/03/2024 09/24/2024 Lacosamide (Tablet) 180.0 90 100 MG NA DORA LEMUS [facility] Medicare 0 / 1 PA   1 [id number] ** 09/03/2024 04/03/2024 Lacosamide (Tablet) 60.0 30 100 MG NA DORA LEMUS [facility] Medicare 5 / 5 PA   1 [id number] ** 08/02/2024 04/03/2024 Lacosamide (Tablet) 60.0 30 100 MG NA DORA LEMUS [facility] Medicare 4 / 5 PA   1 [id number] ** 06/28/2024 04/03/2024 Lacosamide (Tablet) 60.0 30 100 MG NA DORA LEMUS [facility] Medicare 3 / 5 PA           50

## 2025-06-24 NOTE — TELEPHONE ENCOUNTER
Patient called to request a refill for their lacosamide (VIMPAT) 100 mg tablet advised a refill was requested on 6/24/2025 and is pending approval. Patient verbalized understanding and is in agreement.     Patient is leaving on vacation 6/26/2025, and has enough medication to last until then. Please review.     [id number] - [place] - [address] [phone]    Does the patient have enough for 3 days?   [] Yes   [x] No - Send as HP to POD

## 2025-06-25 ENCOUNTER — TELEPHONE (OUTPATIENT)
Age: 65
End: 2025-06-25

## 2025-06-25 DIAGNOSIS — R56.9 SEIZURE (HCC): ICD-10-CM

## 2025-06-25 RX ORDER — LACOSAMIDE 100 MG/1
100 TABLET ORAL 2 TIMES DAILY
Qty: 180 TABLET | Refills: 1 | Status: SHIPPED | OUTPATIENT
Start: 2025-06-25

## 2025-06-25 RX ORDER — LACOSAMIDE 100 MG/1
100 TABLET ORAL EVERY 12 HOURS SCHEDULED
Qty: 180 TABLET | Refills: 0 | OUTPATIENT
Start: 2025-06-25

## 2025-06-25 RX ORDER — LACOSAMIDE 100 MG/1
100 TABLET ORAL 2 TIMES DAILY
Qty: 180 TABLET | Refills: 1 | OUTPATIENT
Start: 2025-06-25

## 2025-06-25 RX ORDER — LACOSAMIDE 100 MG/1
100 TABLET ORAL 2 TIMES DAILY
Qty: 180 TABLET | Refills: 0 | OUTPATIENT
Start: 2025-06-25

## 2025-06-25 NOTE — TELEPHONE ENCOUNTER
Warm Transfer from [place] [facility]    I spoke to patient as she was concerned that her Lacosamide refill was not sent to pharmacy per the pharmacy request and her request she sent as well as she Is leaving on vacation on 6/26/25 (tomorrow).    I apologized to pt for causing her all this concern. I informed Lacosamide 100 mg tab , takes 1 tab every 12 hours was sent to her [facility] pharmacy today by Dr Collins.    I clarified that since we received duplicate request (pt and pharmacy) and we can only fill one request, we needed to refuse the other request.    Pt understood and appreciated to be made aware med was sent.    I informed pt to follow up with Carl to comfirm they received script and when it will be ready for . Pt understood no other questions at this time

## 2025-06-25 NOTE — TELEPHONE ENCOUNTER
Pt called in to advise she just spoke with [facility] pharmacy he told her they have not received a prescription from our office. She'd like someone to call pharmacy to address the issue and give her a call back after.

## 2025-06-25 NOTE — TELEPHONE ENCOUNTER
"Inbound call received from Pharmacy to make us aware they received two scripts today but theyy were both \"cancelled out\" and a new script needs to be sent. Per our system an active script was received by them at 9 am but they said it has been cancelled.     Dr. Jo please send new script if agreeable. Thank you!  "

## 2025-06-25 NOTE — TELEPHONE ENCOUNTER
[facility] tech called informing they need an urgent script for pts Lacosamide as the script that was sent this am was d.c on their end and no longer active.    I requested to speak to pharmacist, so I can give verbal order    I spoke to [facility] Pharmacist to provide Verbal Order for Lacosamide.. I read her Lacosamide ordered placed today by Dr Collins. 100 mg  tab , pt to take 1 tab orally every 12 hours. I provided qty amount (90 days), and refill amount (1). With Providers spelling of her first and last name, NPI # and KHANH #.    [facility] appreciated the update. She will have this med ready in 1 hour or sooner , as she has med in stock and filling med at this time.    [facility] aware I will call pt to update, as I spoke to her early this am, and she was anxious as she leaves for vacation tomorrow    I spoke to pt and she was appreciated this was taken care of and aware she can pick it up in 1 hour.

## 2025-06-25 NOTE — TELEPHONE ENCOUNTER
Patient called in, she was worried because the Locasamide medication that she put in a request in was denied.  I explained to patient that the reason that the medication was most likely denied was because there were duplicate requests, one from pharmacy and one from patient.  To put patient at ease I was able to also get RN Kinza on the line to confirm.